# Patient Record
Sex: FEMALE | Race: OTHER | Employment: OTHER
[De-identification: names, ages, dates, MRNs, and addresses within clinical notes are randomized per-mention and may not be internally consistent; named-entity substitution may affect disease eponyms.]

---

## 2018-08-06 LAB
AVERAGE GLUCOSE: ABNORMAL
HBA1C MFR BLD: 5.9 %

## 2021-09-29 LAB
AVERAGE GLUCOSE: NORMAL
HBA1C MFR BLD: 5 %

## 2022-05-12 PROBLEM — K21.9 GASTROESOPHAGEAL REFLUX DISEASE WITHOUT ESOPHAGITIS: Status: ACTIVE | Noted: 2022-05-12

## 2022-05-12 PROBLEM — R73.01 IMPAIRED FASTING GLUCOSE: Status: ACTIVE | Noted: 2022-05-12

## 2022-05-12 PROBLEM — R32 URINARY INCONTINENCE: Status: ACTIVE | Noted: 2022-05-12

## 2022-05-12 PROBLEM — K57.90 DIVERTICULOSIS: Status: ACTIVE | Noted: 2022-05-12

## 2022-05-12 PROBLEM — R73.09 ELEVATED HEMOGLOBIN A1C: Status: ACTIVE | Noted: 2022-05-12

## 2022-05-24 ENCOUNTER — OFFICE VISIT (OUTPATIENT)
Dept: INTERNAL MEDICINE CLINIC | Facility: CLINIC | Age: 72
End: 2022-05-24
Payer: MEDICARE

## 2022-05-24 VITALS
SYSTOLIC BLOOD PRESSURE: 106 MMHG | RESPIRATION RATE: 12 BRPM | BODY MASS INDEX: 31.25 KG/M2 | WEIGHT: 159.2 LBS | OXYGEN SATURATION: 96 % | DIASTOLIC BLOOD PRESSURE: 64 MMHG | HEIGHT: 60 IN | HEART RATE: 74 BPM

## 2022-05-24 DIAGNOSIS — K21.9 GASTROESOPHAGEAL REFLUX DISEASE WITHOUT ESOPHAGITIS: ICD-10-CM

## 2022-05-24 DIAGNOSIS — R73.03 PREDIABETES: ICD-10-CM

## 2022-05-24 DIAGNOSIS — N95.2 POSTMENOPAUSAL ATROPHIC VAGINITIS: Primary | ICD-10-CM

## 2022-05-24 PROBLEM — K22.70 BARRETT'S ESOPHAGUS WITHOUT DYSPLASIA: Status: ACTIVE | Noted: 2022-05-24

## 2022-05-24 PROBLEM — E78.2 HYPERLIPIDEMIA, MIXED: Status: ACTIVE | Noted: 2022-05-24

## 2022-05-24 PROBLEM — K44.9 HIATAL HERNIA: Status: ACTIVE | Noted: 2022-05-24

## 2022-05-24 PROCEDURE — 99203 OFFICE O/P NEW LOW 30 MIN: CPT | Performed by: INTERNAL MEDICINE

## 2022-05-24 RX ORDER — OMEPRAZOLE 20 MG/1
20 CAPSULE, DELAYED RELEASE ORAL DAILY
COMMUNITY
End: 2022-07-26 | Stop reason: SDUPTHER

## 2022-05-24 ASSESSMENT — ENCOUNTER SYMPTOMS
CHEST TIGHTNESS: 0
PHOTOPHOBIA: 0
WHEEZING: 0
SHORTNESS OF BREATH: 0
ABDOMINAL DISTENTION: 0

## 2022-05-24 NOTE — PROGRESS NOTES
Chief Complaint   Patient presents with   Veronica Quinonez is a 74 y.o. female who presents today for Establish Care  Originally from [facility], has been in [place] for the last 27 years, but moved from [place] on 2021. She is currently living with her daughter, her , and her granddaughter. She is a . She is an astrologist.    She carries a diagnosis of chronic acid reflux, taking antiacids for the last 22 years. She was following with gastroenterologist in [place], doing endoscopies every 3 to 5 years, and colonoscopies every 10 years. She has some records in paper showing history of Zeng's esophagus, hiatal hernia, no dysplasia. Has history of prediabetes, and hyperlipidemia, she is currently not taking other medications and PPI, but also taking multiple vitamins and over-the-counter supplements. Reports history of a stroke in her mother, but her father passed at age of 80 from [address], and he was in good health. She denies any history of cancer in family or personal, had history of hysterectomy, currently using vaginal cream, and she would like to establish care with GYN. She declined vaccination and is not interested in the near future. She tries to eat very healthy including vegetables and fruits, and main source of protein is chicken, and eggs    Wt Readings from Last 3 Encounters:   05/24/22 159 lb 3.2 oz (72.2 kg)           Assessment and plan:  1. Postmenopausal atrophic vaginitis  -     [facility] - Aleah Beaver DO, Gynecology, [place]  2. Gastroesophageal reflux disease without esophagitis  3. BMI 30.0-30.9,adult  4. Prediabetes  Her labs are going to be scanned to records, currently up-to-date with cholesterol panel, and hemoglobin A1c is 6.   We discussed about continue balanced diet, physical activity, she will continue PPI as prescribed by GI, and will establish with gastroenterologist in the near future, will await for records before any other referrals is placed   Will request records from previous PCP  in [place]  In regards her shoulder, will continue doing some stretches exercises, will consider PT or images in the near future    Return in about 5 months (around 10/24/2022) for AWV. Review of system:    Review of Systems   Constitutional: Negative for activity change, fatigue and unexpected weight change. Eyes: Negative for photophobia and visual disturbance. Respiratory: Negative for chest tightness, shortness of breath and wheezing. Cardiovascular: Negative for chest pain, palpitations and leg swelling. Gastrointestinal: Negative for abdominal distention. Genitourinary: Negative for difficulty urinating and frequency. Musculoskeletal: Positive for arthralgias (R shoulder pain). Negative for myalgias. Neurological: Negative for dizziness and headaches. Psychiatric/Behavioral: Negative for behavioral problems and confusion. Immunization history: There is no immunization history on file for this patient. Current medications:      Current Outpatient Medications:     omeprazole (PRILOSEC) 20 MG delayed release capsule, Take 20 mg by mouth daily, Disp: , Rfl:       Family history:    Family History   Problem Relation Age of Onset    Stroke Mother         Past medical history:    Past Medical History:   Diagnosis Date    Zeng's esophagus without dysplasia     BMI 30.0-30.9,adult     GERD (gastroesophageal reflux disease)     Hiatal hernia     Hyperlipidemia, mixed     Prediabetes     S/P hysterectomy           Physical exam:    /64 (Site: Left Upper Arm, Position: Sitting)   Pulse 74   Resp 12   Ht 5' (1.524 m)   Wt 159 lb 3.2 oz (72.2 kg)   SpO2 96%   BMI 31.09 kg/m²     Physical Exam  Vitals reviewed. Constitutional:       Appearance: Normal appearance. Cardiovascular:      Rate and Rhythm: Normal rate and regular rhythm. Heart sounds: Normal heart sounds. Pulmonary:      Effort: Pulmonary effort is normal.      Breath sounds: Normal breath sounds. Abdominal:      Palpations: Abdomen is soft. Musculoskeletal:         General: Normal range of motion. Neurological:      General: No focal deficit present. Mental Status: She is alert and oriented to person, place, and time. Psychiatric:         Mood and Affect: Mood normal.            This document was generated with the aid of voice recognition software. . Please be aware that there may be inadvertent transcription errors not identified and corrected by the [employer]

## 2022-05-24 NOTE — PATIENT INSTRUCTIONS
Patient Education        Vivian Shivers de los medicamentos reductores del ácido  Learning About Acid-Reducing Medicines  ¿Qué son? Los [employer] reductores del ácido pueden ayudar a aliviar la [place] y otros síntomas de la indigestión. Pueden ayudar a prevenir el daño que los ácidos estomacales causan al aparato digestivo. También se usan paratratar los síntomas de las úlceras y el reflujo gastroesofágico.  Estos medicamentos incluyen antagonistas de los receptores H2 e inhibidores de la bomba de protones. Ayudan a que el estómago produzca menos ácido. Puede comprarlos sin receta. Algunos de ellos también vienen en concentraciones deventa con receta. Los antiácidos también pueden ayudar a [employer] síntomas de la [place]. Reducen el ácido que ya está en el estómago. Puede comprarlos sinreceta. Qué medicamento es mejor para usted depende de lo que esté causando sussíntomas. ¿Cómo funcionan? Los medicamentos reductores del ácido funcionan de Sanjay. Los [employer] de los receptores H2 y los inhibidores de la bomba de protones reducen la cantidad de ácido que produce [employer]. No hacen efecto sobre el ácido que ya está allí. Los antiácidos funcionan haciendo que los jugos gástricos francesca menos ácidos. Melissa la acidez gástrica puede volver a medida queel estómago produce más ácido. ¿Cuáles son Ragena Milady? Entre los ejemplos de medicamentos inhibidores de la secreción ácida seincluyen:  Antihistamínicos H2.  Tagamet (cimetidina)   Pepcid (famotidina)  Inhibidores de la bomba de protones.  Nexium (esomeprazol)   Prevacid (lansoprazol)   Prilosec, Zegerid (omeprazol)   Protonix (pantoprazol)   Aciphex (rabeprazol)  Antiácidos.  Gaviscon   Mylanta   Maalox   Tums  ¿Qué efectos secundarios puede tener? Muchas personas no tienen efectos secundarios. Y los efectos secundariosmenores podrían desaparecer al cabo de un tiempo.   Los [employer] de los receptores H2 pueden causarle manoj de [employer]. Podrían causarle diarrea o estreñimiento. Puede tener náuseas y vómitos. Los inhibidores de la bomba de protones pueden causar manoj de [place]. Usarlos bhanu mucho tiempo puede aumentar el riesgo de infecciones ofracturas óseas. Algunos antiácidos pueden causar estreñimiento o diarrea. Los ingredientesvarían en función de la [place]. Pueden tener efectos [employer]. Si [place] machelle cantidad excesiva de un medicamento para la [place], medrano organismo podría no absorber suficiente cantidad de ciertos minerales de sualimentación. ¿Cómo puede kevin estos medicamentos en forma hinojosa? Algunos antagonistas de los receptores H2 e inhibidores de la bomba de protones pueden afectar el funcionamiento de otros medicamentos. Informe a medrano médico si milena otros medicamentos. Él o mateusz quizá pueda cambiarle la dosis o darle unmedicamento diferente. Muchos antiácidos contienen aspirina. Jolynn la etiqueta para asegurarse de que notoma demasiada cantidad. La aspirina en exceso puede ser perjudicial.  Sea lis con los medicamentos. [place] sam medicamentos exactamente nova se los recetaron. Si milena medicamentos de [facility], asegúrese de leer y seguir todas las instrucciones de la [place]. Llame a medrano médico si alejandra que estáteniendo un problema con sam medicamentos. Consulte a medrano médico o farmacéutico antes de kevin cualquier otro medicamento. [place] incluye medicamentos de [facility]. Informe a medrano médico [employer], vitaminas, productos herbarios y suplementos que tome. Tomaralgunos medicamentos juntos puede [employer]. La atención de seguimiento es machelle parte clave de medrano tratamiento y seguridad. Asegúrese de hacer y acudir a todas las citas, y llame a medrano médico si está teniendo problemas. También es machelle buena idea saber los [place] de susexámenes y mantener machelle lista de los medicamentos que milena.   ¿Dónde puede encontrar más información en inglés? Sudhir Bales a [email or web address] e ingrese a medrano cuenta de MyChart. Skinny Mathur T722 en el cuadro \"Search Health Information\" para más información (en inglés) sobre \"Aprenda acerca de los medicamentos reductores del ácido. \"     Si no tiene machelle cuenta, antwan karina en el enlace \"Sign Up Now\". Revisado: 8 septiembre, 2021               Versión del contenido: 13.2  © 7242-0326 Healthwise, Incorporated. Las instrucciones de cuidado fueron adaptadas bajo licencia por [facility] ([facility]). Si usted tiene [place] afección médica o sobre estas instrucciones, siempre pregunte a medrano profesional de laron. Healthwise, Incorporated niega toda garantía o responsabilidad por medrano uso de esta información.

## 2022-06-02 ENCOUNTER — OFFICE VISIT (OUTPATIENT)
Dept: INTERNAL MEDICINE CLINIC | Facility: CLINIC | Age: 72
End: 2022-06-02
Payer: MEDICARE

## 2022-06-02 VITALS
BODY MASS INDEX: 31.61 KG/M2 | HEART RATE: 78 BPM | SYSTOLIC BLOOD PRESSURE: 108 MMHG | WEIGHT: 161 LBS | HEIGHT: 60 IN | OXYGEN SATURATION: 98 % | DIASTOLIC BLOOD PRESSURE: 70 MMHG

## 2022-06-02 DIAGNOSIS — M25.511 ACUTE PAIN OF RIGHT SHOULDER: Primary | ICD-10-CM

## 2022-06-02 DIAGNOSIS — R21 RASH: ICD-10-CM

## 2022-06-02 PROCEDURE — 1123F ACP DISCUSS/DSCN MKR DOCD: CPT | Performed by: INTERNAL MEDICINE

## 2022-06-02 PROCEDURE — 99214 OFFICE O/P EST MOD 30 MIN: CPT | Performed by: INTERNAL MEDICINE

## 2022-06-02 PROCEDURE — 3017F COLORECTAL CA SCREEN DOC REV: CPT | Performed by: INTERNAL MEDICINE

## 2022-06-02 PROCEDURE — G8400 PT W/DXA NO RESULTS DOC: HCPCS | Performed by: INTERNAL MEDICINE

## 2022-06-02 PROCEDURE — 1090F PRES/ABSN URINE INCON ASSESS: CPT | Performed by: INTERNAL MEDICINE

## 2022-06-02 PROCEDURE — G8427 DOCREV CUR MEDS BY ELIG CLIN: HCPCS | Performed by: INTERNAL MEDICINE

## 2022-06-02 PROCEDURE — G8417 CALC BMI ABV UP PARAM F/U: HCPCS | Performed by: INTERNAL MEDICINE

## 2022-06-02 PROCEDURE — 1036F TOBACCO NON-USER: CPT | Performed by: INTERNAL MEDICINE

## 2022-06-02 RX ORDER — TRIAMCINOLONE ACETONIDE OINTMENT USP, 0.05% 0.5 MG/G
OINTMENT TOPICAL 2 TIMES DAILY
Qty: 60 G | Refills: 0 | Status: SHIPPED | OUTPATIENT
Start: 2022-06-02

## 2022-06-02 ASSESSMENT — ENCOUNTER SYMPTOMS
SHORTNESS OF BREATH: 0
ABDOMINAL DISTENTION: 0

## 2022-06-02 NOTE — PROGRESS NOTES
Chief Complaint   Patient presents with    Shoulder Pain        Renny Benavides is a 74 y.o. female who presents today for Shoulder Pain     She had a couple of concerns  #1 right shoulder pain that is started in March, she denies any trauma, pain started in the joint, and radiate to the lower arm, she has been trying over-the-counter creams like diclofenac gel and essential oils. Has been applying heat and ice since then. She has tried Tylenol, but she is not able to take any anticoagulant or is due to her history of acid reflux, and gastritis,. 2.  Rash in neck and anterior chest that comes and goes, has been present on and off for the last year denies any new products of the skin, detergents, or new clothing has tried cortisone cream with partial relief but the rash always come back      Wt Readings from Last 3 Encounters:   06/02/22 161 lb (73 kg)   05/24/22 159 lb 3.2 oz (72.2 kg)     Vitals:    06/02/22 1138   BP: 108/70   Site: Left Upper Arm   Position: Sitting   Pulse: 78   SpO2: 98%   Weight: 161 lb (73 kg)   Height: 5' (1.524 m)        Assessment and plan:  1. Acute pain of right shoulder  -     XR SHOULDER RIGHT (MIN 2 VIEWS); Future  2. Rash  -     triamcinolone (KENALOG) 0.05 % OINT ointment; Apply topically 2 times daily, Topical, 2 TIMES DAILY Starting u 6/2/2022, Disp-60 g, R-0, Normal  In regards to her shoulder pain could be associated with tendinitis, AC impingement syndrome, she had good range of motion but somehow limited by pain, and she had a very tender point. We will rule out bone lesions, but she may need to be referred to orthopedic provider for further treatment like corticosteroid injections. She was given exercises to try at home, will consider physical therapy. Rash could be urticaria, contact dermatitis, she has been advised to pay attention to possible triggers, and will try intermediate potency corticosteroid    No follow-ups on file.      Review of system:    Review of Systems   Constitutional: Negative for activity change, fatigue and unexpected weight change. Respiratory: Negative for shortness of breath. Cardiovascular: Negative for chest pain and palpitations. Gastrointestinal: Negative for abdominal distention. Musculoskeletal: Positive for arthralgias. Negative for joint swelling. Skin: Positive for rash. Neurological: Negative for dizziness and headaches. Immunization history: There is no immunization history on file for this patient. Current medications:      Current Outpatient Medications:     triamcinolone (KENALOG) 0.05 % OINT ointment, Apply topically 2 times daily, Disp: 60 g, Rfl: 0    omeprazole (PRILOSEC) 20 MG delayed release capsule, Take 20 mg by mouth daily, Disp: , Rfl:       Family history:    Family History   Problem Relation Age of Onset    Stroke Mother         Past medical history:    Past Medical History:   Diagnosis Date    Zeng's esophagus without dysplasia     BMI 30.0-30.9,adult     GERD (gastroesophageal reflux disease)     Hiatal hernia     Hyperlipidemia, mixed     Prediabetes     S/P hysterectomy           Physical exam:    /70 (Site: Left Upper Arm, Position: Sitting)   Pulse 78   Ht 5' (1.524 m)   Wt 161 lb (73 kg)   LMP  (LMP Unknown)   SpO2 98%   BMI 31.44 kg/m²     Physical Exam  Vitals reviewed. Constitutional:       Appearance: Normal appearance. HENT:      Head: Normocephalic and atraumatic. Cardiovascular:      Rate and Rhythm: Normal rate. Pulmonary:      Effort: Pulmonary effort is normal.   Musculoskeletal:      Right shoulder: Tenderness (AC Joint tenderness) present. No swelling, deformity or crepitus. Decreased range of motion. Normal strength. Normal pulse. Left shoulder: Normal.   Skin:     Findings: Rash present. Neurological:      General: No focal deficit present. Mental Status: She is alert. Recent labs:     No results found for: CHOL  No results found for: TRIG  No results found for: HDL  No results found for: LDLCHOLESTEROL, LDLCALC  No results found for: LABVLDL, VLDL  No results found for: CHOLHDLRATIO  No results found for: NA, K, CL, CO2, BUN, CREATININE, GLUCOSE, CALCIUM, PROT, LABALBU, BILITOT, ALKPHOS, AST, ALT, LABGLOM, GFRAA, AGRATIO, GLOB  No results found for: WBC, HGB, HCT, MCV, PLT          This document was generated with the aid of voice recognition software. . Please be aware that there may be inadvertent transcription errors not identified and corrected by the [employer]

## 2022-06-02 NOTE — PATIENT INSTRUCTIONS
Patient Education   Patient Education        Bursitis de hombro: Ejercicios  Shoulder Bursitis: Exercises  Instrucciones de cuidado  Estos son algunos ejemplos de ejercicios típicos de rehabilitación para medrano afección. Comience cada ejercicio lentamente. Reduzca la intensidad delejercicio si Lori Virgil a sentir dolor. Medrano médico o el fisioterapeuta le dirán cuándo puede comenzar con estosejercicios y cuáles funcionarán mejor para usted. Cómo se hacen los ejercicios  Ejercicio de estiramiento posterior    1. Sostenga el codo del brazo lesionado con la otra mano. 2. Use la mano para tirar suavemente del brazo lesionado hacia arriba y BREST lado opuesto del cuerpo. Sentirá un leve estiramiento en la parte posterior del hombro lesionado. 3. Sosténgalo por lo menos de 15 a 30 segundos. Luego baje lentamente el brazo. 4. Repita de 2 a 4 veces. Estiramiento por detrás de la espalda    Es posible que medrano médico o fisioterapeuta quiera que espere para hacer crescencio estiramiento hasta que haya recuperado la mayor parte de medrano Thyra Kiang y amplitud de [place]. Puede hacer crescencio estiramiento de [place]. Mantenga cualquiera de estos estiramientos al menos de 15 a 30 segundos, y repítalos de2 a 4 veces. 1. Estiramiento leve: Coloque la mano en el bolsillo trasero y déjela allí para estirar el hombro. 2. Estiramiento moderado: Con la otra mano, sostenga el brazo afectado (con la garcia hacia afuera) detrás de la espalda sujetándose la shailesh. Tire suavemente del brazo hacia arriba para estirar el hombro. 3. Estiramiento avanzado: Colóquese machelle toalla sobre el otro hombro. Coloque la mano del brazo lesionado detrás de la espalda. Ahora, sostenga la parte trasera de la toalla. Con la otra mano, sostenga el extremo anterior de la toalla que se encuentra en la parte delantera del cuerpo. Tire suavemente del extremo anterior de la toalla para que la mano suba aún más por la espalda a fin de estirar el hombro.   Estiramiento sobre la guy    1. Parado a aproximadamente un brazo de distancia, agárrese a machelle superficie sólida. Puede usar [employer], la perilla de machelle tanvir o el respaldo de machelle silla [place] vira. 2. Con las rodillas ligeramente flexionadas, inclínese hacia adelante con los brazos estirados. Baje la parte superior del cuerpo y deje que los hombros se estiren. 3. A medida que los hombros puedan Män[address], evangelina vez tenga que koby un [address] atrás. 4. Mantenga la posición bhanu un mínimo de 15 a 30 segundos. Rollo Ping y relájese. Si había dado un paso hacia atrás bhanu el estiramiento, dé un paso hacia adelante para que pueda [employer] en la superficie sólida. 5. Repita de 2 a 4 veces. Flexión del hombro (acostado)    Para hacer machelle vara para crescencio ejercicio, use un pedazo de tubo PVC o un valerie de escoba sin la escoba. Hal machelle vara que sea aproximadamente un pie (30centímetros) más ancha que sam hombros.  1. Acuéstese boca arriba, sosteniendo la vara con ambas sil. Las sinai deben estar hacia abajo mientras sostiene la vara. 2. Manteniendo los codos estirados, levante lentamente los brazos sobre la guy. Levántelos hasta que sienta un estiramiento en los hombros, en la parte superior de la espalda y en el pecho. 3. Mantenga la posición entre 15 y 27 segundos. 4. Repita de 2 a 4 veces. Rotación del hombro (acostado)    Para hacer machelle vara para crescencio ejercicio, use un pedazo de tubo PVC o un valerie de escoba sin la escoba. Hal machelle vara que sea aproximadamente un pie (30centímetros) más ancha que sam hombros.  1. Acuéstese boca arriba. Sostenga la vara con ambas sil, con los codos flexionados y las sinai [place]. 2. Mantenga los codos cerca del cuerpo y mueva la vara horizontalmente hacia el brazo dolorido. 3. Mantenga la posición entre 8 y 12 segundos. 4. Repita de 2 a 4 veces. Compresión de omóplatos    1. Párese con los brazos a los lados y [employer] omóplatos.  No levante los hombros mientras comprime los omóplatos. 2. Mantenga la posición bhanu 6 segundos. 3. Repita de 8 a 12 veces. Ejercicio de los flexores y extensores del hombro    Estos son Lyndia Hof. [place] significa que contrae los músculos sintener que moverse. 1. Empujar hacia adelante (flexionar): Párese frente a machelle pared o el ryder de [place], a unas 6 pulgadas (15 cm) o menos. Mantenga medrano brazo lesionado contra medrano cuerpo. Cierre el puño con el pulgar por [place]. Luego, empuje suavemente la mano hacia adelante y contra la pared, con alrededor del 25% al 50% de medrano fuerza. No deje que medrano cuerpo se mueva hacia atrás [employer]. Mantenga la posición bhanu unos 6 segundos. Relájese bhanu unos segundos. Repita de 8 a 12 veces. 2. Andria Hams atrás (extender): Párese con la espalda completamente contra la pared. El brazo debe estar apoyado en la pared, con medrano codo doblado en un ángulo de 90 grados (la mano hacia adelante). Empuje el codo suavemente hacia atrás contra la pared, con alrededor del 25% al 50% de medrano fuerza. No deje que medrano cuerpo se mueva hacia adelante mientras empuja. Mantenga la posición bhanu unos 6 segundos. Relájese bhanu unos segundos. Repita de 8 a 12 veces. Ejercicio escapular: Lagartijas de pared    Crescencio ejercicio se realiza mejor con los dedos girados un poco hacia afuera, enlugar de que apunten [place]. 1. Párese frente a machelle pared, a entre 12 y 18 pulgadas (30 a 45 cm) de distancia. 2. Coloque las sil en la pared, a la altura de los hombros.  3. Doble los brazos lentamente y acerque la deepthi a la pared. Mantenga la espalda y las caderas [facility]. 4. Empuje para regresar a donde empezó. 5. Repita de 8 a 12 veces. 6. Cuando pueda hacer crescencio ejercicio en la pared cómodamente, puede probarlo contra machelle encimera. Después puede avanzar lentamente a hacerlo en el extremo de un sofá, después en machelle silla sólida y finalmente en el suelo.   Ejercicio escapular: Retracción    Para crescencio ejercicio necesitará material elástico para el ejercicio, nova tubosquirúrgicos o Thera[employer]Band. 1. Coloque la martinez alrededor de un objeto sólido a la altura de la cintura. (El poste de machelle cama funcionará serena). Cada mano debe sujetar un extremo de la martinez. 2. Con los codos a los lados y flexionados en un ángulo de 90 grados, tire de la martinez [place] atrás. Sam omóplatos deberán tratar de juntarse. Krystina Leaven los brazos hacia donde [place]. 3. Repita de 8 a 12 veces. 4. Si tiene machelle buena amplitud de [employer] hombros, intente crescencio ejercicio con los brazos [facility]. Mantenga los codos en un ángulo de 90 grados. Eleve la martinez elástica hasta aproximadamente la altura del hombro. Tire de la martinez para tratar de juntar sam omóplatos. Krystina Leaven los brazos hacia donde [place]. Ejercicio de fortalecimiento del rotador interno    1. Comience por atar un pedazo de material elástico para ejercicios a la perilla de machelle tanvir. Puede usar tubos quirúrgicos o Thera[employer]Band. 2. Párese o siéntese con el hombro relajado y el codo doblado en ángulo de 90 grados. La parte superior del brazo debe descansar cómodamente sobre el costado. [employer]priete machelle toalla enrollada entre el codo y el cuerpo por comodidad. Boyce ayudará a mantener el Albino Dorothy a medrano costado. 3. Sujete un extremo de la martinez elástica en la mano del brazo dolorido. 4. Gire el antebrazo lentamente hacia el cuerpo hasta que toque el abdomen. Muévalo lentamente de vuelta hacia donde [facility]. 5. Mantenga el codo y la parte superior del brazo apretados firmemente contra la toalla o contra el costado de medrano cuerpo. 6. Repita de 8 a 12 veces. Ejercicio de fortalecimiento del rotador externo    1. Comience por atar un pedazo de material elástico para ejercicio a la perilla de machelle tanvir. Puede usar un tubo quirúrgico o Thera-Band. (También puede sostener un extremo de la martinez en cada mano).   2. Párese o siéntese con el hombro relajado y el codo flexionado en ángulo de 90 grados. La parte superior del brazo debe descansar cómodamente sobre el costado. Apriete machelle toalla enrollada entre el codo y el cuerpo por comodidad. Cassandra ayudará a mantener el brazo contra el costado. 3. Sostenga un extremo de la martinez elástica con la mano del brazo dolorido. 4. Comience con el antebrazo cruzando el abdomen. Gire lentamente el antebrazo alejándolo del cuerpo. Vianne Messenger codo y la parte superior del brazo apretados contra la toalla o contra el costado del cuerpo hasta que empiece a sentir tensión en el hombro. Mueva el brazo lentamente hacia donde Facundo Palacios. 5. Repita de 8 a 12 veces. La atención de seguimiento es machelle parte clave de medrano tratamiento y seguridad. Asegúrese de hacer y acudir a todas las citas, y llame a merdano médico si está teniendo problemas. También es machelle buena idea saber los [place] de susexámenes y mantener machelle lista de los medicamentos que milena. ¿Dónde puede encontrar más información en inglés? Sudhir Bales a [email or web address] e ingrese a medrano cuenta de MyChart. Skinny Mathur S841 en el cuadro \"Search Health Information\" para más información (en inglés) sobre \"Bursitis de hombro: Ejercicios. \"     Si no tiene machelle cuenta, antwan karina en el enlace \"Sign Up Now\". Revisado: 1 julio, 2021               Versión del contenido: 13.2  © 6971-8650 Healthwise, [employer]. Las instrucciones de cuidado fueron adaptadas bajo licencia por [facility] ([facility]). Si usted tiene [place] afección médica o sobre estas instrucciones, siempre pregunte a medrano profesional de laron. [facility] niega toda garantía o responsabilidad por medrano uso de esta información. Problemas del manguito de los rotadores: Instrucciones de cuidado  Rotator Cuff Problems: Care Instructions  Generalidades     El manguito de los rotadores es un sue de tendones y [employer] alrededor del hombro que mantienen estable la articulación del hombro.  Es lo que le permite levantar y girar el Jacinda Carry. Con el tiempo, el uso y el ejercicio diarios pueden hacer que los tendones rocen contra los huesos del hombro. [place] se llama pinzamiento. Esta afección puede hacer que los tendones sufran contusiones,degeneración o desgarro. En muchas personas, estos problemas no causan dolor. Cuando de hecho causan dolor, usted puede hacer cosas para reducir el dolor y la hinchazón. Estas incluyen descanso, fisioterapia, hielo y calor, y medicamentos antiinflamatorios. Si todavía le duele después de probar estos tratamientos, usted y medrano médico pueden conversar acerca de recibir machelle infiltración conesteroides u operarse. La atención de seguimiento es machelle parte clave de medrano tratamiento y seguridad. Asegúrese de hacer y acudir a todas las citas, y llame a medrano médico si está teniendo problemas. También es machelle buena idea saber los [place] de susexámenes y mantener machelle lista de los medicamentos que milena. ¿Cómo puede cuidarse en el hogar?  Sea lis con los medicamentos. Jolynn y siga todas las indicaciones de la [place]. ? Si el médico le recetó un analgésico, tómelo según las indicaciones. ? Si no está tomando un analgésico recetado, pregúntele a medrano médico si puede kevin j carlos de [employer].  Aplíquese hielo o machelle compresa fría sobre el hombro por entre 10 y 21 minutos cada vez. Trate de hacerlo con machelle frecuencia de 1 a 2 horas bhanu los siguientes 3 días (cuando esté despierto). Póngase un paño rendon entre el hielo y la piel.  Después de 2 o 3 días, si no tiene hinchazón, aplíquese calor. Póngase machelle bolsa de agua tibia, machelle almohadilla térmica ajustada a baja temperatura o un paño tibio sobre el hombro. No se vaya a dormir con machelle almohadilla térmica sobre la piel. Póngase un paño rendon entre la almohadilla térmica y la piel.  Mientras sostiene un paño tibio sobre el hombro, inclínese hacia adelante de modo que el brazo cuelgue libremente y balancee suavemente el Jacinda Carry Isadore Grater y Jorge Sparrow atrás nova un Tarsha Maryjane. También puede hacer esto de pie bajo machelle ducha tibia.  Siga los consejos de medrano médico en cuanto a fisioterapia. Cuando el médico lo autorice, pruebe estos ejercicios de estiramiento. Hágalos lentamente para evitar lesiones. Póngase machelle toalla tibia y húmeda sobre el hombro antes del ejercicio. Detenga cualquier ejercicio que aumente el dolor. ? Ejercicios de amplitud de movimiento. Si no le duele demasiado, estire el brazo en cuatro direcciones: por geetha del cuerpo [employer] lado opuesto, por detrás de la espalda, hacia el costado y por encima de la guy. ? Ejercicio de péndulo. Inclínese hacia adelante y sosténgase de machelle cavazos o del respaldo de machelle silla con el brazo toñito. Inclínese al nivel de la cintura y deje que el brazo del hombro adolorido cuelgue recto hacia abajo. Balancee el brazo nova un péndulo, luego en círculos que comiencen pequeños y se amplíen de forma gradual. Heidy ejercicio no [place] los músculos del brazo. En medrano lugar, use sam piernas y caderas para crear un movimiento que antwan que medrano brazo se balancee libremente. Trate de [employer] unos 5 minutos, varias veces al día. ? Escalamiento de pared (de lado). Párese de costado cerca de la pared de manera que sam dedos puedan apenas tocarla. Voltéese hasta que medrano cuerpo esté levemente hacia la pared. Camine con los dedos del brazo lesionado por la pared, y llegue de santiago alto nova el dolor se lo permita. Trate de no encoger el hombro hacia la oreja mientras mueve el brazo Jorge Sparrow arriba. Mantenga la posición mientras cuenta hasta entre 15 y 27 segundos. Camine con los dedos hacia abajo a la posición inicial. Repita de 2 a 4 veces y trate de llegar cada vez más alto. ? Escalamiento de pared (de frente). Párese de frente a la pared de manera que sam dedos puedan apenas tocarla. Camine con los dedos del brazo lesionado por la pared, llegando tan alto nova el dolor se lo permita.  Trate de no encoger el hombro hacia la oreja mientras mueve el brazo [place] arriba. Mantenga la posición mientras cuenta hasta entre 15 y 27 segundos. Camine con los dedos lentamente hacia abajo a la posición inicial. Repita de 2 a 4 veces y trate de llegar cada vez más alto.  Descanse el hombro cuando no esté haciendo estiramientos u otros ejercicios. Medrano médico podría indicarle que espere a que el dolor desaparezca antes de hacer ejercicios. No levante bolsas de supermercado pesadas ni antwan deporte o cualquier cosa que le antwan girar o poner tensión en el hombro. Evite actividades en las que tenga que  el brazo afectado por encima de la guy. ¿Cuándo debe pedir ayuda? Llame a medrano médico ahora mismo o busque atención médica inmediata si:     Tiene dolor intenso.      No puede  el hombro o el brazo.      Tiene hormigueo o entumecimiento en el brazo o en la mano.      El brazo o la mano está frío o pálido. Preste especial atención a los cambios en medrano laron y asegúrese de comunicarsecon medrano médico si:     Medrano dolor empeora.      Tiene nueva hinchazón en el brazo o en la mano, o la hinchazón [facility].      No mejora nova se esperaba. ¿Dónde puede encontrar más información en inglés? Darline Lewis a [email or web address] e ingrese a medrano cuenta de Paolo. Winda Rist [address] en el Vandana Baker \"Search Health Information\" para más información (en inglés) sobre \"Problemas del manguito de los rotadores: Instrucciones de cuidado. \"     Si no tiene machelle cuenta, antwan karina en el enlace \"Sign Up Now\". Revisado: 1 julio, 2021               Versión del contenido: 13.2  © 0887-5076 Healthwise, Incorporated. Las instrucciones de cuidado fueron adaptadas bajo licencia por [facility] Si usted tiene [place] afección médica o sobre estas instrucciones, siempre pregunte a medrano profesional de laron. [employer], [employer] niega toda garantía o responsabilidad por medrano uso de esta información.

## 2022-07-16 DIAGNOSIS — M25.511 ACUTE PAIN OF RIGHT SHOULDER: ICD-10-CM

## 2022-07-18 ENCOUNTER — TELEPHONE (OUTPATIENT)
Dept: INTERNAL MEDICINE CLINIC | Facility: CLINIC | Age: 72
End: 2022-07-18

## 2022-07-18 NOTE — TELEPHONE ENCOUNTER
----- Message from TESSIE Jorge CNP sent at 7/18/2022 11:31 AM EDT -----  No acute findings on right shoulder x-ray.

## 2022-07-26 ENCOUNTER — OFFICE VISIT (OUTPATIENT)
Dept: INTERNAL MEDICINE CLINIC | Facility: CLINIC | Age: 72
End: 2022-07-26
Payer: MEDICARE

## 2022-07-26 VITALS
HEART RATE: 72 BPM | RESPIRATION RATE: 16 BRPM | HEIGHT: 60 IN | WEIGHT: 161 LBS | DIASTOLIC BLOOD PRESSURE: 78 MMHG | OXYGEN SATURATION: 98 % | BODY MASS INDEX: 31.61 KG/M2 | SYSTOLIC BLOOD PRESSURE: 128 MMHG

## 2022-07-26 DIAGNOSIS — K58.9 IRRITABLE BOWEL SYNDROME, UNSPECIFIED TYPE: ICD-10-CM

## 2022-07-26 DIAGNOSIS — G89.29 CHRONIC RIGHT SHOULDER PAIN: Primary | ICD-10-CM

## 2022-07-26 DIAGNOSIS — K21.9 GASTROESOPHAGEAL REFLUX DISEASE WITHOUT ESOPHAGITIS: ICD-10-CM

## 2022-07-26 DIAGNOSIS — M25.511 CHRONIC RIGHT SHOULDER PAIN: Primary | ICD-10-CM

## 2022-07-26 PROCEDURE — 99214 OFFICE O/P EST MOD 30 MIN: CPT | Performed by: INTERNAL MEDICINE

## 2022-07-26 PROCEDURE — 3017F COLORECTAL CA SCREEN DOC REV: CPT | Performed by: INTERNAL MEDICINE

## 2022-07-26 PROCEDURE — G8417 CALC BMI ABV UP PARAM F/U: HCPCS | Performed by: INTERNAL MEDICINE

## 2022-07-26 PROCEDURE — 1036F TOBACCO NON-USER: CPT | Performed by: INTERNAL MEDICINE

## 2022-07-26 PROCEDURE — G8427 DOCREV CUR MEDS BY ELIG CLIN: HCPCS | Performed by: INTERNAL MEDICINE

## 2022-07-26 PROCEDURE — G8400 PT W/DXA NO RESULTS DOC: HCPCS | Performed by: INTERNAL MEDICINE

## 2022-07-26 PROCEDURE — 1090F PRES/ABSN URINE INCON ASSESS: CPT | Performed by: INTERNAL MEDICINE

## 2022-07-26 PROCEDURE — 1123F ACP DISCUSS/DSCN MKR DOCD: CPT | Performed by: INTERNAL MEDICINE

## 2022-07-26 RX ORDER — OMEPRAZOLE 20 MG/1
20 CAPSULE, DELAYED RELEASE ORAL DAILY
Qty: 90 CAPSULE | Refills: 1 | Status: SHIPPED | OUTPATIENT
Start: 2022-07-26 | End: 2022-09-20 | Stop reason: SDUPTHER

## 2022-07-26 RX ORDER — CHLORDIAZEPOXIDE HYDROCHLORIDE AND CLIDINIUM BROMIDE 5; 2.5 MG/1; MG/1
1 CAPSULE ORAL 2 TIMES DAILY PRN
Qty: 30 CAPSULE | Refills: 0 | Status: SHIPPED | OUTPATIENT
Start: 2022-07-26 | End: 2022-09-20 | Stop reason: SDUPTHER

## 2022-07-26 ASSESSMENT — ENCOUNTER SYMPTOMS
ABDOMINAL DISTENTION: 0
ABDOMINAL PAIN: 1
SHORTNESS OF BREATH: 0

## 2022-07-26 NOTE — ASSESSMENT & PLAN NOTE
Chronic right shoulder pain, possible tendinitis, normal x-ray, anti-inflammatories are current indicated due to her chronic history of acid reflux, and GERD, she will continue with Salonpas patch, or lidocaine patches, diclofenac gel, exercises, and formal referral to physical therapy.   If not improvement, will consider evaluation by orthopedic providers, for possible rotator cuff lesions

## 2022-07-26 NOTE — ASSESSMENT & PLAN NOTE
Refill her antiacid, and Librax, we discussed about importance of taking this medication as prescribed, effects discussed with patient, she will take only once or twice a day, as needed.

## 2022-07-26 NOTE — PROGRESS NOTES
Chief Complaint   Patient presents with    Follow-up     Shoulder pain         Otoniel Lancaster is a 74 y.o. female who presents today for Follow-up (Shoulder pain )     Follow-up after her recent x-ray of the right shoulder, the x-ray did not show any fractures or abnormalities, she continued to exercise, but she noticed that after the exercises for shoulder her pain is exacerbated, she is planning to go to [place], and is going to do physical therapy there, she is requesting that referral.    She also is requesting refill of her omeprazole and Librax, which she has been taking for her chronic IBS, she has been seen by gastroenterologist in the past, history of Zeng's esophagus    Overall feeling stable, no significant changes since her last visit    Wt Readings from Last 3 Encounters:   07/26/22 161 lb (73 kg)   06/02/22 161 lb (73 kg)   05/24/22 159 lb 3.2 oz (72.2 kg)     Vitals:    07/26/22 1020   BP: 128/78   Site: Right Upper Arm   Position: Sitting   Pulse: 72   Resp: 16   SpO2: 98%   Weight: 161 lb (73 kg)   Height: 5' (1.524 m)        Assessment and plan:  1. Chronic right shoulder pain  Assessment & Plan:  Chronic right shoulder pain, possible tendinitis, normal x-ray, anti-inflammatories are current indicated due to her chronic history of acid reflux, and GERD, she will continue with Salonpas patch, or lidocaine patches, diclofenac gel, exercises, and formal referral to physical therapy. If not improvement, will consider evaluation by orthopedic providers, for possible rotator cuff lesions  Orders:  -     External Referral to Physical Therapy  -     diclofenac sodium (VOLTAREN) 1 % GEL; Apply 2 g topically 4 times daily as needed for Pain, Topical, 4 TIMES DAILY PRN Starting Tue 7/26/2022, Disp-150 g, R-1, Normal  2. Gastroesophageal reflux disease without esophagitis  -     omeprazole (PRILOSEC) 20 MG delayed release capsule;  Take 1 capsule by mouth in the morning., Disp-90 capsule, history:    Past Medical History:   Diagnosis Date    Zeng's esophagus without dysplasia     BMI 30.0-30.9,adult     GERD (gastroesophageal reflux disease)     Hiatal hernia     Hyperlipidemia, mixed     Prediabetes     S/P hysterectomy           Physical exam:    /78 (Site: Right Upper Arm, Position: Sitting)   Pulse 72   Resp 16   Ht 5' (1.524 m)   Wt 161 lb (73 kg)   LMP  (LMP Unknown)   SpO2 98%   BMI 31.44 kg/m²     Physical Exam  Vitals reviewed. Constitutional:       Appearance: Normal appearance. HENT:      Head: Normocephalic and atraumatic. Cardiovascular:      Rate and Rhythm: Normal rate and regular rhythm. Pulmonary:      Effort: Pulmonary effort is normal.      Breath sounds: Normal breath sounds. Abdominal:      Palpations: Abdomen is soft. Musculoskeletal:      Comments: Persistent pain in [facility] joint and bicep tendon insertion points   Neurological:      General: No focal deficit present. Mental Status: She is alert and oriented to person, place, and time. Psychiatric:         Mood and Affect: Mood normal.         Behavior: Behavior normal.              This document was generated with the aid of voice recognition software. . Please be aware that there may be inadvertent transcription errors not identified and corrected by the [employer]

## 2022-09-20 ENCOUNTER — OFFICE VISIT (OUTPATIENT)
Dept: INTERNAL MEDICINE CLINIC | Facility: CLINIC | Age: 72
End: 2022-09-20
Payer: MEDICARE

## 2022-09-20 VITALS
DIASTOLIC BLOOD PRESSURE: 78 MMHG | SYSTOLIC BLOOD PRESSURE: 110 MMHG | OXYGEN SATURATION: 98 % | WEIGHT: 163.6 LBS | HEIGHT: 60 IN | HEART RATE: 72 BPM | RESPIRATION RATE: 16 BRPM | BODY MASS INDEX: 32.12 KG/M2

## 2022-09-20 DIAGNOSIS — Z12.11 SCREEN FOR COLON CANCER: ICD-10-CM

## 2022-09-20 DIAGNOSIS — G89.29 CHRONIC PAIN OF LEFT KNEE: ICD-10-CM

## 2022-09-20 DIAGNOSIS — Z00.00 MEDICARE ANNUAL WELLNESS VISIT, SUBSEQUENT: ICD-10-CM

## 2022-09-20 DIAGNOSIS — Z11.9 SCREENING EXAMINATION FOR INFECTIOUS DISEASE: ICD-10-CM

## 2022-09-20 DIAGNOSIS — Z00.00 MEDICARE ANNUAL WELLNESS VISIT, SUBSEQUENT: Primary | ICD-10-CM

## 2022-09-20 DIAGNOSIS — G89.29 CHRONIC RIGHT SHOULDER PAIN: ICD-10-CM

## 2022-09-20 DIAGNOSIS — K58.9 IRRITABLE BOWEL SYNDROME, UNSPECIFIED TYPE: ICD-10-CM

## 2022-09-20 DIAGNOSIS — M25.562 CHRONIC PAIN OF LEFT KNEE: ICD-10-CM

## 2022-09-20 DIAGNOSIS — R73.03 PREDIABETES: ICD-10-CM

## 2022-09-20 DIAGNOSIS — K21.9 GASTROESOPHAGEAL REFLUX DISEASE WITHOUT ESOPHAGITIS: ICD-10-CM

## 2022-09-20 DIAGNOSIS — E78.2 HYPERLIPIDEMIA, MIXED: ICD-10-CM

## 2022-09-20 DIAGNOSIS — M25.511 CHRONIC RIGHT SHOULDER PAIN: ICD-10-CM

## 2022-09-20 LAB
BASOPHILS # BLD: 0 K/UL (ref 0–0.2)
BASOPHILS NFR BLD: 1 % (ref 0–2)
DIFFERENTIAL METHOD BLD: ABNORMAL
EOSINOPHIL # BLD: 0.2 K/UL (ref 0–0.8)
EOSINOPHIL NFR BLD: 3 % (ref 0.5–7.8)
ERYTHROCYTE [DISTWIDTH] IN BLOOD BY AUTOMATED COUNT: 13.6 % (ref 11.9–14.6)
HCT VFR BLD AUTO: 42.7 % (ref 35.8–46.3)
HGB BLD-MCNC: 13.1 G/DL (ref 11.7–15.4)
IMM GRANULOCYTES # BLD AUTO: 0 K/UL (ref 0–0.5)
IMM GRANULOCYTES NFR BLD AUTO: 0 % (ref 0–5)
LYMPHOCYTES # BLD: 2.3 K/UL (ref 0.5–4.6)
LYMPHOCYTES NFR BLD: 37 % (ref 13–44)
MCH RBC QN AUTO: 28.8 PG (ref 26.1–32.9)
MCHC RBC AUTO-ENTMCNC: 30.7 G/DL (ref 31.4–35)
MCV RBC AUTO: 93.8 FL (ref 79.6–97.8)
MONOCYTES # BLD: 0.6 K/UL (ref 0.1–1.3)
MONOCYTES NFR BLD: 10 % (ref 4–12)
NEUTS SEG # BLD: 3 K/UL (ref 1.7–8.2)
NEUTS SEG NFR BLD: 48 % (ref 43–78)
NRBC # BLD: 0 K/UL (ref 0–0.2)
PLATELET # BLD AUTO: 283 K/UL (ref 150–450)
PMV BLD AUTO: 9.8 FL (ref 9.4–12.3)
RBC # BLD AUTO: 4.55 M/UL (ref 4.05–5.2)
WBC # BLD AUTO: 6.1 K/UL (ref 4.3–11.1)

## 2022-09-20 PROCEDURE — 1123F ACP DISCUSS/DSCN MKR DOCD: CPT | Performed by: INTERNAL MEDICINE

## 2022-09-20 PROCEDURE — G0439 PPPS, SUBSEQ VISIT: HCPCS | Performed by: INTERNAL MEDICINE

## 2022-09-20 PROCEDURE — 3017F COLORECTAL CA SCREEN DOC REV: CPT | Performed by: INTERNAL MEDICINE

## 2022-09-20 RX ORDER — OMEPRAZOLE 20 MG/1
20 CAPSULE, DELAYED RELEASE ORAL DAILY
Qty: 90 CAPSULE | Refills: 1 | Status: SHIPPED | OUTPATIENT
Start: 2022-09-20

## 2022-09-20 RX ORDER — CHLORDIAZEPOXIDE HYDROCHLORIDE AND CLIDINIUM BROMIDE 5; 2.5 MG/1; MG/1
1 CAPSULE ORAL 2 TIMES DAILY PRN
Qty: 30 CAPSULE | Refills: 0 | Status: SHIPPED | OUTPATIENT
Start: 2022-09-20

## 2022-09-20 ASSESSMENT — PATIENT HEALTH QUESTIONNAIRE - PHQ9
SUM OF ALL RESPONSES TO PHQ9 QUESTIONS 1 & 2: 0
1. LITTLE INTEREST OR PLEASURE IN DOING THINGS: 0
2. FEELING DOWN, DEPRESSED OR HOPELESS: 0
SUM OF ALL RESPONSES TO PHQ QUESTIONS 1-9: 0

## 2022-09-20 ASSESSMENT — LIFESTYLE VARIABLES
HOW MANY STANDARD DRINKS CONTAINING ALCOHOL DO YOU HAVE ON A TYPICAL DAY: PATIENT DOES NOT DRINK
HOW OFTEN DO YOU HAVE A DRINK CONTAINING ALCOHOL: NEVER

## 2022-09-20 NOTE — PROGRESS NOTES
Medicare Annual Wellness Visit    Francisco Arce is here for Medicare AWV    Assessment & Plan   Medicare annual wellness visit, subsequent  -     CBC with Auto Differential; Future  -     Comprehensive Metabolic Panel; Future  -     Lipid Panel; Future  -     TSH; Future  -     Hepatitis C Antibody; Future  -     Hemoglobin A1C; Future  -     HIV 1/2 Ag/Ab, 4TH Generation,W Rflx Confirm; Future  Prediabetes  -     Hemoglobin A1C; Future  BMI 30.0-30.9,adult  -     CBC with Auto Differential; Future  -     Comprehensive Metabolic Panel; Future  -     Lipid Panel; Future  -     TSH; Future  -     Hemoglobin A1C; Future  Hyperlipidemia, mixed  -     CBC with Auto Differential; Future  -     Comprehensive Metabolic Panel; Future  -     Lipid Panel; Future  -     TSH; Future  Screening examination for infectious disease  -     Hepatitis C Antibody; Future  -     HIV 1/2 Ag/Ab, 4TH Generation,W Rflx Confirm; Future  Gastroesophageal reflux disease without esophagitis  -     chlordiazePOXIDE-clidinium (LIBRAX) 5-2.5 MG per capsule; Take 1 capsule by mouth 2 times daily as needed (abdominal spams). , Disp-30 capsule, R-0Normal  -     omeprazole (PRILOSEC) 20 MG delayed release capsule; Take 1 capsule by mouth daily, Disp-90 capsule, R-1Normal  -     External Referral To Gastroenterology  Irritable bowel syndrome, unspecified type  -     chlordiazePOXIDE-clidinium (LIBRAX) 5-2.5 MG per capsule; Take 1 capsule by mouth 2 times daily as needed (abdominal spams). , Disp-30 capsule, R-0Normal  -     External Referral To Gastroenterology  Chronic right shoulder pain  -     [facility], [address] for colon cancer  -     External Referral To Gastroenterology  Chronic pain of left knee  -     [address] [place] [address]      Once again forms for medical records have been obtained, from different providers including her colorectal surgeon, gastroenterologist, and gynecologist, we will follow-up again in 8 to 12 weeks, in regards her knee shoulder pain, she was recommended to continue physical therapy, and to establish care with orthopedic provider in the area, she would prefer German-speaking, for her providers  Recommendations for Preventive Services Due: see orders and patient instructions/AVS.  Recommended screening schedule for the next 5-10 years is provided to the patient in written form: see Patient Instructions/AVS.     Return in 13 weeks (on 12/20/2022) for pain , records , IBS. Subjective   The following acute and/or chronic problems were also addressed today:  She is here for annual wellness visit, unfortunately we have not yet obtain her records, from previous providers in [place], she reports went to [place] few weeks ago, and completed some of the physical therapist while she was there, she also had her GYN exam, and mammogram, and reports bone density done last year,    She is currently active, she tries to exercise, but continued to deal with right shoulder pain, and left knee pain that has been ongoing for many years, but lately her symptoms have been exacerbated. She recalls previous injections in her knee, and would like to establish with a provider in [place], she does not like to take medications, because upset her stomach, and IBS symptoms, she also has been following with GI over the last years, reports being up-to-date with endoscopies and colonoscopies, but unfortunately we do not have records yet. She reports previous vaccines for pneumonia, and tetanus shot, and is not interested in COVID-19 vaccines    She is also looking for a provider in the area they can manage her screening colonoscopies, and IBS, she is requesting refill of her medications today.   She takes them only as needed,    Her risk assessment and screening showed that she did not have a living will, or advance care planning, today she mentioned her daughter as possible person in chart, but she does not have any documents, she was given information about living well and an advanced care planning      Patient's complete Health Risk Assessment and screening values have been reviewed and are found in Flowsheets. The following problems were reviewed today and where indicated follow up appointments were made and/or referrals ordered.     Positive Risk Factor Screenings with Interventions:             General Health and ACP:  General  In general, how would you say your health is?: Good  In the past 7 days, have you experienced any of the following: New or Increased Pain, New or Increased Fatigue, Loneliness, Social Isolation, Stress or Anger?: No  Do you get the social and emotional support that you need?: Yes  Do you have a Living Will?: (!) No    Advance Directives       Power of  Living Will ACP-Advance Directive ACP-Power of     Not on File Not on File Not on File Not on File          General Health Risk Interventions:  Pain issues: referred to ortho and PT, physical therapy referral ordered    Health Habits/Nutrition:  Physical Activity: Sufficiently Active    Days of Exercise per Week: 5 days    Minutes of Exercise per Session: 40 min     Have you lost any weight without trying in the past 3 months?: No  Body mass index: (!) 31.95  Have you seen the dentist within the past year?: Yes  Health Habits/Nutrition Interventions:  Nutritional issues:  we discussed about weight , exercise and dietary changes     Safety:  Do you have working smoke detectors?: (!) No  Do you have any tripping hazards - loose or unsecured carpets or rugs?: No  Do you have any tripping hazards - clutter in doorways, halls, or stairs?: No  Do you have either shower bars, grab bars, non-slip mats or non-slip surfaces in your shower or bathtub?: (!) No  Do all of your stairways have a railing or banister?: Not Applicable  Do you always fasten your seatbelt when you are in a

## 2022-09-20 NOTE — PATIENT INSTRUCTIONS
Personalized Preventive Plan for Mike Chick - 9/20/2022  Medicare offers a range of preventive health benefits. Some of the tests and screenings are paid in full while other may be subject to a deductible, co-insurance, and/or copay. Some of these benefits include a comprehensive review of your medical history including lifestyle, illnesses that may run in your family, and various assessments and screenings as appropriate. After reviewing your medical record and screening and assessments performed today your provider may have ordered immunizations, labs, imaging, and/or referrals for you. A list of these orders (if applicable) as well as your Preventive Care list are included within your After Visit Summary for your review. Other Preventive Recommendations:    A preventive eye exam performed by an eye specialist is recommended every 1-2 years to screen for glaucoma; cataracts, macular degeneration, and other eye disorders. A preventive dental visit is recommended every 6 months. Try to get at least 150 minutes of exercise per week or 10,000 steps per day on a pedometer . Order or download the FREE \"Exercise & Physical Activity: Your Everyday Guide\" from [employer]. Call 5-744.533.3817 or search [employer] online. You need 0060-1174 mg of calcium and 1960-7307 IU of vitamin D per day. It is possible to meet your calcium requirement with diet alone, but a vitamin D supplement is usually necessary to meet this goal.  When exposed to the sun, use a sunscreen that protects against both UVA and UVB radiation with an SPF of 30 or greater. Reapply every 2 to 3 hours or after sweating, drying off with a towel, or swimming. Always wear a seat belt when traveling in a car. Always wear a helmet when riding a bicycle or motorcycle.

## 2022-09-21 LAB
ALBUMIN SERPL-MCNC: 4.1 G/DL (ref 3.2–4.6)
ALBUMIN/GLOB SERPL: 1.1 {RATIO} (ref 1.2–3.5)
ALP SERPL-CCNC: 96 U/L (ref 50–136)
ALT SERPL-CCNC: 29 U/L (ref 12–65)
ANION GAP SERPL CALC-SCNC: 5 MMOL/L (ref 4–13)
AST SERPL-CCNC: 27 U/L (ref 15–37)
BILIRUB SERPL-MCNC: 0.5 MG/DL (ref 0.2–1.1)
BUN SERPL-MCNC: 14 MG/DL (ref 8–23)
CALCIUM SERPL-MCNC: 9.7 MG/DL (ref 8.3–10.4)
CHLORIDE SERPL-SCNC: 108 MMOL/L (ref 101–110)
CHOLEST SERPL-MCNC: 207 MG/DL
CO2 SERPL-SCNC: 25 MMOL/L (ref 21–32)
CREAT SERPL-MCNC: 0.6 MG/DL (ref 0.6–1)
EST. AVERAGE GLUCOSE BLD GHB EST-MCNC: 126 MG/DL
GLOBULIN SER CALC-MCNC: 3.8 G/DL (ref 2.3–3.5)
GLUCOSE SERPL-MCNC: 110 MG/DL (ref 65–100)
HBA1C MFR BLD: 6 % (ref 4.8–5.6)
HCV AB SER QL: NONREACTIVE
HDLC SERPL-MCNC: 44 MG/DL (ref 40–60)
HDLC SERPL: 4.7 {RATIO}
HIV 1+2 AB+HIV1 P24 AG SERPL QL IA: NONREACTIVE
HIV 1/2 RESULT COMMENT: NORMAL
LDLC SERPL CALC-MCNC: 133 MG/DL
POTASSIUM SERPL-SCNC: 5.1 MMOL/L (ref 3.5–5.1)
PROT SERPL-MCNC: 7.9 G/DL (ref 6.3–8.2)
SODIUM SERPL-SCNC: 138 MMOL/L (ref 136–145)
TRIGL SERPL-MCNC: 150 MG/DL (ref 35–150)
TSH, 3RD GENERATION: 2.88 UIU/ML (ref 0.36–3.74)
VLDLC SERPL CALC-MCNC: 30 MG/DL (ref 6–23)